# Patient Record
Sex: FEMALE | Race: WHITE | NOT HISPANIC OR LATINO | Employment: OTHER | URBAN - METROPOLITAN AREA
[De-identification: names, ages, dates, MRNs, and addresses within clinical notes are randomized per-mention and may not be internally consistent; named-entity substitution may affect disease eponyms.]

---

## 2021-07-28 ENCOUNTER — HOSPITAL ENCOUNTER (EMERGENCY)
Facility: MEDICAL CENTER | Age: 71
End: 2021-07-28
Attending: EMERGENCY MEDICINE
Payer: MEDICARE

## 2021-07-28 VITALS
SYSTOLIC BLOOD PRESSURE: 150 MMHG | BODY MASS INDEX: 28.68 KG/M2 | TEMPERATURE: 98.2 F | DIASTOLIC BLOOD PRESSURE: 78 MMHG | HEART RATE: 80 BPM | OXYGEN SATURATION: 98 % | HEIGHT: 62 IN | WEIGHT: 155.87 LBS | RESPIRATION RATE: 12 BRPM

## 2021-07-28 DIAGNOSIS — M54.32 SCIATICA OF LEFT SIDE: ICD-10-CM

## 2021-07-28 PROCEDURE — A9270 NON-COVERED ITEM OR SERVICE: HCPCS | Performed by: EMERGENCY MEDICINE

## 2021-07-28 PROCEDURE — 99283 EMERGENCY DEPT VISIT LOW MDM: CPT

## 2021-07-28 PROCEDURE — 700102 HCHG RX REV CODE 250 W/ 637 OVERRIDE(OP): Performed by: EMERGENCY MEDICINE

## 2021-07-28 RX ORDER — CYCLOBENZAPRINE HCL 10 MG
5 TABLET ORAL ONCE
Status: COMPLETED | OUTPATIENT
Start: 2021-07-28 | End: 2021-07-28

## 2021-07-28 RX ORDER — NAPROXEN 500 MG/1
500 TABLET ORAL ONCE
Status: COMPLETED | OUTPATIENT
Start: 2021-07-28 | End: 2021-07-28

## 2021-07-28 RX ORDER — METHYLPREDNISOLONE 4 MG/1
TABLET ORAL
Qty: 1 EACH | Refills: 0 | Status: SHIPPED | OUTPATIENT
Start: 2021-07-28

## 2021-07-28 RX ORDER — NAPROXEN 500 MG/1
500 TABLET ORAL 2 TIMES DAILY WITH MEALS
Qty: 30 TABLET | Refills: 0 | Status: SHIPPED | OUTPATIENT
Start: 2021-07-28

## 2021-07-28 RX ORDER — DEXAMETHASONE 4 MG/1
8 TABLET ORAL ONCE
Status: COMPLETED | OUTPATIENT
Start: 2021-07-28 | End: 2021-07-28

## 2021-07-28 RX ADMIN — CYCLOBENZAPRINE 5 MG: 10 TABLET, FILM COATED ORAL at 08:37

## 2021-07-28 RX ADMIN — DEXAMETHASONE 8 MG: 4 TABLET ORAL at 08:37

## 2021-07-28 RX ADMIN — NAPROXEN 500 MG: 500 TABLET ORAL at 08:37

## 2021-07-28 NOTE — ED PROVIDER NOTES
ED Provider Note    CHIEF COMPLAINT  Chief Complaint   Patient presents with   • Low Back Pain     sciatica hx, feels the same, denies trauma or injurious event       HPI  Crystal Malave is a 71 y.o. female who presents to the emergency department with complaint of sciatica.  She states she feels painful burning in her buttocks goes down the lateral aspect of her left leg.  She does have a history of sciatica and stated this is very similar to that.  Pain increases with movement decreased with rest, is no radiation to her groin, abdomen her back.  She denies dysuria, hematochezia, melena, hematemesis, fever, saddle anesthesia, urinary or bowel incontinence or retention    REVIEW OF SYSTEMS  Positives as above. Pertinent negatives include fever, saddle anesthesia, loss of sensation or strength or legs, difficulty walking, bowel bladder incontinence or retention, use of anticoagulant, IV drug use, any fever or trauma  All other 10 review of systems are negative    PAST MEDICAL HISTORY  Past Medical History:   Diagnosis Date   • Diabetes (HCC)        FAMILY HISTORY  Noncontributory    SOCIAL HISTORY  Social History     Socioeconomic History   • Marital status:      Spouse name: Not on file   • Number of children: Not on file   • Years of education: Not on file   • Highest education level: Not on file   Occupational History   • Not on file   Tobacco Use   • Smoking status: Never Smoker   • Smokeless tobacco: Never Used   Vaping Use   • Vaping Use: Never used   Substance and Sexual Activity   • Alcohol use: Never   • Drug use: Never   • Sexual activity: Not on file   Other Topics Concern   • Not on file   Social History Narrative   • Not on file     Social Determinants of Health     Financial Resource Strain:    • Difficulty of Paying Living Expenses:    Food Insecurity:    • Worried About Running Out of Food in the Last Year:    • Ran Out of Food in the Last Year:    Transportation Needs:    • Lack of  "Transportation (Medical):    • Lack of Transportation (Non-Medical):    Physical Activity:    • Days of Exercise per Week:    • Minutes of Exercise per Session:    Stress:    • Feeling of Stress :    Social Connections:    • Frequency of Communication with Friends and Family:    • Frequency of Social Gatherings with Friends and Family:    • Attends [employer] Services:    • Active Member of Clubs or Organizations:    • Attends Club or Organization Meetings:    • Marital Status:    Intimate Partner Violence:    • Fear of Current or Ex-Partner:    • Emotionally Abused:    • Physically Abused:    • Sexually Abused:        SURGICAL HISTORY  History reviewed. No pertinent surgical history.    CURRENT MEDICATIONS  Home Medications    **Home medications have not yet been reviewed for this encounter**         ALLERGIES  No Known Allergies    PHYSICAL EXAM  VITAL SIGNS: /78   Pulse 80   Temp 36.8 °C (98.2 °F) (Temporal)   Resp 12   Ht 1.575 m (5' 2\")   Wt 70.7 kg (155 lb 13.8 oz)   SpO2 98%   BMI 28.51 kg/m²      Constitutional: Well developed, Well nourished, No acute distress, Non-toxic appearance.   Eyes: PERRLA, EOMI, Conjunctiva normal, No discharge.   Skin: Warm, Dry, No erythema, No rash.   Extremities: Full range of motion, no deformity, no edema.  Back: No lumbar spinous process tenderness or muscle spasm, she does have significant tenderness in the left gluteal region where the piriformis muscle  Neurologic: Leg lift 5/5 bilaterally, plantar flexion dorsiflexion 5/5 bilaterally, DTRs are 2/4 L4 and S1 bilaterally, no saddle anesthesia, normal ambulation    COURSE & MEDICAL DECISION MAKING  Pertinent Labs & Imaging studies reviewed. (See chart for details)  This is a pleasant 71-year-old female presents with sciatica-like symptoms.  She does have a history in the past and presents clinic and historically with sciatica.  Not suspect aortic dissection, aortic aneurysm, urinary tract infection, epidural " abscess, epidural hematoma.  The patient did receive Decadron, Naprosyn as well as cyclobenzaprine here in the emergency department.  It is prescribed her Medrol Dosepak as well as Naprosyn.  I do not suspect cauda equina syndrome as well.  The patient will be discharged and we following up with Christie and strict return precautions have been given for increasing symptoms.      FINAL IMPRESSION     1. Sciatica of left side        DISPOSITION:  Patient will be discharged home in stable condition.    FOLLOW UP:  [facility], Emergency Dept  [address] [place] [id number] [phone]          OUTPATIENT MEDICATIONS:  Discharge Medication List as of 7/28/2021  8:58 AM      START taking these medications    Details   methylPREDNISolone (MEDROL DOSEPAK) 4 MG Tablet Therapy Pack Use as directed, Disp-1 Each, R-0, Print Rx Paper      naproxen (NAPROSYN) 500 MG Tab Take 1 tablet by mouth 2 times a day with meals., Disp-30 tablet, R-0, Print Rx Paper                 Electronically signed by: Aayush Burris D.O., 7/28/2021 8:27 AM

## 2021-07-28 NOTE — DISCHARGE INSTRUCTIONS
Return to the emergency department you have severe pain, loss of sensation or strength between your legs, difficulty urinating or urinating on yourself.

## 2021-07-28 NOTE — ED TRIAGE NOTES
Chief Complaint   Patient presents with   • Low Back Pain     sciatica hx, feels the same, denies trauma or injurious event